# Patient Record
(demographics unavailable — no encounter records)

---

## 2025-03-05 NOTE — DISCUSSION/SUMMARY
[FreeTextEntry1] : In 2022, Diandra presented with palpitations, and was found to be in atrial fibrillation with fast ventricular rates.  Her echocardiogram confirmed severe mitral regurgitation, with severe left atrial enlargement, and severe tricuspid regurgitation.  She is now status post mechanical MVR, tricuspid valve repair, maze ablation, and left atrial appendage clip on 1/8/2024.    She has recovered quite well.  She is in a sinus rhythm today and appears euvolemic on exam.  She is walking without dyspnea.  She will continue on Coumadin with a goal INR of 2.5-3.5.  She has missed doses here and there, though promises to be more compliant.  She will need antibiotics before any dental procedure and a prescription for amoxicillin has been given.   I will see her again in 6 months.  We will repeat an echocardiogram this year to evaluate her MVR.. [EKG obtained to assist in diagnosis and management of assessed problem(s)] : EKG obtained to assist in diagnosis and management of assessed problem(s)

## 2025-03-05 NOTE — HISTORY OF PRESENT ILLNESS
[FreeTextEntry1] : Diandra is a pleasant 41-year-old female here for evaluation.  She was seen by Dr. Quiroz in 2018.  Prior to this, she had been evaluated by cardiothoracic surgeon, because a transesophageal echo showed severe mitral regurgitation, with restricted mitral valve leaflets, along with moderate tricuspid regurgitation, in 2012.  She had no symptoms at this time, so she did not follow-up.  More recently, she had an echo in our office in 2018 which demonstrated moderate to severe mitral regurgitation.  She had been doing well until the last time I saw her in 8/22 .  She presented to the emergency room with palpitations.  She was found to be in atrial fibrillation with fast ventricular response.  She was rate controlled with IV diltiazem and oral metoprolol, and was eventually discharged home with rate controlled AF on diltiazem, metoprolol, and Eliquis.  An echocardiogram demonstrated severe left atrial enlargement, severe mitral regurgitation, and severe tricuspid regurgitation.  A transesophageal echocardiogram in 10/22 that confirmed moderate mitral stenosis, severe MR, and severe TR.  She has since been evaluated by cardiothoracic surgeon, and has been recommended to have a mechanical MVR, tricuspid repair, and KYLAH clipping.  The surgery was planned though was canceled because of thrombocytopenia, with a platelet count as low as 28.  This was believed to be from the Lasix, which has since been changed to torsemide, with only mild improvement.  A cardiac catheterization demonstrated no obstructive CAD.  Her wedge pressure was elevated at 29 mmHg.  I last saw her in 2/24. She is now status post mechanical MVR, tricuspid valve repair, maze ablation, and left atrial appendage clip on 1/8/2024.  Her postoperative course was complicated by slow inotropic wean and a junctional rhythm.  She was on amiodarone which was discontinued.  Overall, she is doing quite well.   She has no significant dyspnea, palpitations or chest pain. She has no edema or additional dizziness. Her INR has been very labile.  She admits to not being completely compliant.

## 2025-03-05 NOTE — PHYSICAL EXAM
[Well Developed] : well developed [Well Nourished] : well nourished [No Acute Distress] : no acute distress [Normal Conjunctiva] : normal conjunctiva [Normal Venous Pressure] : normal venous pressure [No Carotid Bruit] : no carotid bruit [Normal S1, S2] : normal S1, S2 [No Rub] : no rub [No Gallop] : no gallop [Clear Lung Fields] : clear lung fields [Good Air Entry] : good air entry [No Respiratory Distress] : no respiratory distress  [Soft] : abdomen soft [Non Tender] : non-tender [No Masses/organomegaly] : no masses/organomegaly [Normal Bowel Sounds] : normal bowel sounds [Normal Gait] : normal gait [No Edema] : no edema [No Cyanosis] : no cyanosis [No Clubbing] : no clubbing [No Varicosities] : no varicosities [No Rash] : no rash [No Skin Lesions] : no skin lesions [Moves all extremities] : moves all extremities [No Focal Deficits] : no focal deficits [Normal Speech] : normal speech [Alert and Oriented] : alert and oriented [Normal memory] : normal memory [de-identified] : +[facility] click